# Patient Record
Sex: MALE | Race: BLACK OR AFRICAN AMERICAN | NOT HISPANIC OR LATINO | URBAN - METROPOLITAN AREA
[De-identification: names, ages, dates, MRNs, and addresses within clinical notes are randomized per-mention and may not be internally consistent; named-entity substitution may affect disease eponyms.]

---

## 2020-01-28 ENCOUNTER — INPATIENT (INPATIENT)
Facility: HOSPITAL | Age: 35
LOS: 1 days | Discharge: HOME | End: 2020-01-30
Attending: HOSPITALIST | Admitting: HOSPITALIST
Payer: MEDICAID

## 2020-01-28 VITALS
SYSTOLIC BLOOD PRESSURE: 112 MMHG | DIASTOLIC BLOOD PRESSURE: 70 MMHG | OXYGEN SATURATION: 100 % | TEMPERATURE: 100 F | HEART RATE: 92 BPM | RESPIRATION RATE: 18 BRPM

## 2020-01-28 LAB
ALBUMIN SERPL ELPH-MCNC: 3.4 G/DL — SIGNIFICANT CHANGE UP (ref 3.3–5)
ALP SERPL-CCNC: 120 U/L — SIGNIFICANT CHANGE UP (ref 40–120)
ALT FLD-CCNC: 225 U/L — HIGH (ref 4–41)
ANION GAP SERPL CALC-SCNC: 13 MMO/L — SIGNIFICANT CHANGE UP (ref 7–14)
ANISOCYTOSIS BLD QL: SLIGHT — SIGNIFICANT CHANGE UP
AST SERPL-CCNC: 226 U/L — HIGH (ref 4–40)
B PERT DNA SPEC QL NAA+PROBE: NOT DETECTED — SIGNIFICANT CHANGE UP
BASE EXCESS BLDV CALC-SCNC: 4 MMOL/L — SIGNIFICANT CHANGE UP
BASOPHILS # BLD AUTO: 0.06 K/UL — SIGNIFICANT CHANGE UP (ref 0–0.2)
BASOPHILS NFR BLD AUTO: 0.8 % — SIGNIFICANT CHANGE UP (ref 0–2)
BASOPHILS NFR SPEC: 0.9 % — SIGNIFICANT CHANGE UP (ref 0–2)
BILIRUB SERPL-MCNC: 1.8 MG/DL — HIGH (ref 0.2–1.2)
BLASTS # FLD: 0 % — SIGNIFICANT CHANGE UP (ref 0–0)
BLOOD GAS VENOUS - CREATININE: 1.31 MG/DL — HIGH (ref 0.5–1.3)
BUN SERPL-MCNC: 15 MG/DL — SIGNIFICANT CHANGE UP (ref 7–23)
C PNEUM DNA SPEC QL NAA+PROBE: NOT DETECTED — SIGNIFICANT CHANGE UP
CALCIUM SERPL-MCNC: 8.8 MG/DL — SIGNIFICANT CHANGE UP (ref 8.4–10.5)
CHLORIDE BLDV-SCNC: 94 MMOL/L — LOW (ref 96–108)
CHLORIDE SERPL-SCNC: 89 MMOL/L — LOW (ref 98–107)
CO2 SERPL-SCNC: 25 MMOL/L — SIGNIFICANT CHANGE UP (ref 22–31)
CREAT SERPL-MCNC: 1.24 MG/DL — SIGNIFICANT CHANGE UP (ref 0.5–1.3)
EOSINOPHIL # BLD AUTO: 0.01 K/UL — SIGNIFICANT CHANGE UP (ref 0–0.5)
EOSINOPHIL NFR BLD AUTO: 0.1 % — SIGNIFICANT CHANGE UP (ref 0–6)
EOSINOPHIL NFR FLD: 0 % — SIGNIFICANT CHANGE UP (ref 0–6)
FLUAV H1 2009 PAND RNA SPEC QL NAA+PROBE: NOT DETECTED — SIGNIFICANT CHANGE UP
FLUAV H1 RNA SPEC QL NAA+PROBE: NOT DETECTED — SIGNIFICANT CHANGE UP
FLUAV H3 RNA SPEC QL NAA+PROBE: NOT DETECTED — SIGNIFICANT CHANGE UP
FLUAV SUBTYP SPEC NAA+PROBE: NOT DETECTED — SIGNIFICANT CHANGE UP
FLUBV RNA SPEC QL NAA+PROBE: NOT DETECTED — SIGNIFICANT CHANGE UP
GAS PNL BLDV: 126 MMOL/L — LOW (ref 136–146)
GIANT PLATELETS BLD QL SMEAR: PRESENT — SIGNIFICANT CHANGE UP
GLUCOSE BLDV-MCNC: 114 MG/DL — HIGH (ref 70–99)
GLUCOSE SERPL-MCNC: 111 MG/DL — HIGH (ref 70–99)
HADV DNA SPEC QL NAA+PROBE: NOT DETECTED — SIGNIFICANT CHANGE UP
HCO3 BLDV-SCNC: 27 MMOL/L — SIGNIFICANT CHANGE UP (ref 20–27)
HCOV PNL SPEC NAA+PROBE: SIGNIFICANT CHANGE UP
HCT VFR BLD CALC: 33.3 % — LOW (ref 39–50)
HCT VFR BLDV CALC: 40 % — SIGNIFICANT CHANGE UP (ref 39–51)
HGB BLD-MCNC: 12.2 G/DL — LOW (ref 13–17)
HGB BLDV-MCNC: 13 G/DL — SIGNIFICANT CHANGE UP (ref 13–17)
HMPV RNA SPEC QL NAA+PROBE: NOT DETECTED — SIGNIFICANT CHANGE UP
HPIV1 RNA SPEC QL NAA+PROBE: NOT DETECTED — SIGNIFICANT CHANGE UP
HPIV2 RNA SPEC QL NAA+PROBE: NOT DETECTED — SIGNIFICANT CHANGE UP
HPIV3 RNA SPEC QL NAA+PROBE: NOT DETECTED — SIGNIFICANT CHANGE UP
HPIV4 RNA SPEC QL NAA+PROBE: NOT DETECTED — SIGNIFICANT CHANGE UP
IMM GRANULOCYTES NFR BLD AUTO: 4.4 % — HIGH (ref 0–1.5)
LACTATE BLDV-MCNC: 2.4 MMOL/L — HIGH (ref 0.5–2)
LYMPHOCYTES # BLD AUTO: 2.65 K/UL — SIGNIFICANT CHANGE UP (ref 1–3.3)
LYMPHOCYTES # BLD AUTO: 34.1 % — SIGNIFICANT CHANGE UP (ref 13–44)
LYMPHOCYTES NFR SPEC AUTO: 14.8 % — SIGNIFICANT CHANGE UP (ref 13–44)
MCHC RBC-ENTMCNC: 28.1 PG — SIGNIFICANT CHANGE UP (ref 27–34)
MCHC RBC-ENTMCNC: 36.6 % — HIGH (ref 32–36)
MCV RBC AUTO: 76.7 FL — LOW (ref 80–100)
METAMYELOCYTES # FLD: 0 % — SIGNIFICANT CHANGE UP (ref 0–1)
MONOCYTES # BLD AUTO: 1.01 K/UL — HIGH (ref 0–0.9)
MONOCYTES NFR BLD AUTO: 13 % — SIGNIFICANT CHANGE UP (ref 2–14)
MONOCYTES NFR BLD: 6.9 % — SIGNIFICANT CHANGE UP (ref 2–9)
MYELOCYTES NFR BLD: 0 % — SIGNIFICANT CHANGE UP (ref 0–0)
NEUTROPHIL AB SER-ACNC: 58.3 % — SIGNIFICANT CHANGE UP (ref 43–77)
NEUTROPHILS # BLD AUTO: 3.71 K/UL — SIGNIFICANT CHANGE UP (ref 1.8–7.4)
NEUTROPHILS NFR BLD AUTO: 47.6 % — SIGNIFICANT CHANGE UP (ref 43–77)
NEUTS BAND # BLD: 5.2 % — SIGNIFICANT CHANGE UP (ref 0–6)
NRBC # FLD: 0 K/UL — SIGNIFICANT CHANGE UP (ref 0–0)
OTHER - HEMATOLOGY %: 0 — SIGNIFICANT CHANGE UP
PCO2 BLDV: 45 MMHG — SIGNIFICANT CHANGE UP (ref 41–51)
PH BLDV: 7.42 PH — SIGNIFICANT CHANGE UP (ref 7.32–7.43)
PLATELET # BLD AUTO: 90 K/UL — LOW (ref 150–400)
PLATELET COUNT - ESTIMATE: SIGNIFICANT CHANGE UP
PMV BLD: 12.2 FL — SIGNIFICANT CHANGE UP (ref 7–13)
PO2 BLDV: 38 MMHG — SIGNIFICANT CHANGE UP (ref 35–40)
POLYCHROMASIA BLD QL SMEAR: SLIGHT — SIGNIFICANT CHANGE UP
POTASSIUM BLDV-SCNC: 4 MMOL/L — SIGNIFICANT CHANGE UP (ref 3.4–4.5)
POTASSIUM SERPL-MCNC: 3.6 MMOL/L — SIGNIFICANT CHANGE UP (ref 3.5–5.3)
POTASSIUM SERPL-SCNC: 3.6 MMOL/L — SIGNIFICANT CHANGE UP (ref 3.5–5.3)
PROMYELOCYTES # FLD: 0 % — SIGNIFICANT CHANGE UP (ref 0–0)
PROT SERPL-MCNC: 7.2 G/DL — SIGNIFICANT CHANGE UP (ref 6–8.3)
RBC # BLD: 4.34 M/UL — SIGNIFICANT CHANGE UP (ref 4.2–5.8)
RBC # FLD: 13.7 % — SIGNIFICANT CHANGE UP (ref 10.3–14.5)
RSV RNA SPEC QL NAA+PROBE: NOT DETECTED — SIGNIFICANT CHANGE UP
RV+EV RNA SPEC QL NAA+PROBE: NOT DETECTED — SIGNIFICANT CHANGE UP
SAO2 % BLDV: 68.2 % — SIGNIFICANT CHANGE UP (ref 60–85)
SMUDGE CELLS # BLD: PRESENT — SIGNIFICANT CHANGE UP
SODIUM SERPL-SCNC: 127 MMOL/L — LOW (ref 135–145)
TARGETS BLD QL SMEAR: SLIGHT — SIGNIFICANT CHANGE UP
VARIANT LYMPHS # BLD: 13.9 % — SIGNIFICANT CHANGE UP
WBC # BLD: 7.78 K/UL — SIGNIFICANT CHANGE UP (ref 3.8–10.5)
WBC # FLD AUTO: 7.78 K/UL — SIGNIFICANT CHANGE UP (ref 3.8–10.5)

## 2020-01-28 PROCEDURE — 71046 X-RAY EXAM CHEST 2 VIEWS: CPT | Mod: 26

## 2020-01-28 RX ORDER — SODIUM CHLORIDE 9 MG/ML
1000 INJECTION INTRAMUSCULAR; INTRAVENOUS; SUBCUTANEOUS ONCE
Refills: 0 | Status: COMPLETED | OUTPATIENT
Start: 2020-01-28 | End: 2020-01-28

## 2020-01-28 RX ORDER — ACETAMINOPHEN 500 MG
975 TABLET ORAL ONCE
Refills: 0 | Status: COMPLETED | OUTPATIENT
Start: 2020-01-28 | End: 2020-01-28

## 2020-01-28 RX ADMIN — SODIUM CHLORIDE 1000 MILLILITER(S): 9 INJECTION INTRAMUSCULAR; INTRAVENOUS; SUBCUTANEOUS at 21:31

## 2020-01-28 RX ADMIN — Medication 975 MILLIGRAM(S): at 21:31

## 2020-01-28 NOTE — ED PROVIDER NOTE - PROGRESS NOTE DETAILS
pt informed of results. pending us to be done. will need admission for elevated LFT hx malaria. Endorsed to Dr Moreno.

## 2020-01-28 NOTE — ED ADULT NURSE NOTE - CHIEF COMPLAINT QUOTE
Pt was recently in [place] x 3 weeks ago then went to [facility] arrives home today. Pt was sick while in [facility] reports developed fever on the 20th went to hospital there and tested positive for malaria,which he was then prescribed 1/27 medication for it. Pt today not feeling well temperature fluctuating feeling weak.
WDL

## 2020-01-28 NOTE — ED PROVIDER NOTE - CLINICAL SUMMARY MEDICAL DECISION MAKING FREE TEXT BOX
35 y/o M denies pmh c/o fever, body aches, lethargy x 8 days. Pt reports he was in [place] and [place] earlier this month, [place] the [place] on the 15th, then went to [facility] the next day. Pt began feeling symptoms on the 20th, went to seek medical care on the 24th, diagnosed positive for malaria on the 24th. Has been taking chloroquine and primiquine since. Also has been taking tylenol which relives the fever. Denies uri s/s-congestion, cough; denies sick contacts, cp, sob, rash, abd pain, n/v/d.    a/p: r/o other infectious causes; labs, rvp, ua , cxr; fluids, tylenol

## 2020-01-28 NOTE — ED PROVIDER NOTE - OBJECTIVE STATEMENT
35 y/o M denies pmh c/o fever, body aches, lethargy x 8 days. Pt reports he was in [place] and [place] earlier this month, [place] the [place] on the [place]th, then went to [facility] the next day. Pt began feeling symptoms on the 20th, went to seek medical care on the 24th, diagnosed positive for malaria on the 24th. Has been taking chloroquine and primiquine since. Also has been taking tylenol which relives the fever. Denies uri s/s-congestion, cough; denies sick contacts, cp, sob, rash, abd pain, n/v/d. 35 y/o M denies pmh c/o fever, body aches, lethargy x 8 days. Pt reports he was in [place], [place], [place] earlier this month, ran out of his malaria prophylaxis,  [place]rived to the [place] on the 15th, then went to [facility] the next day. Pt began feeling symptoms on the 20th, went to seek medical care on the 24th, diagnosed positive for malaria on the 24th. Has been taking chloroquine and primiquine since. Also has been taking tylenol which relives the fever. Denies uri s/s-congestion, cough; denies sick contacts, cp, sob, rash, abd pain, n/v/d.

## 2020-01-28 NOTE — ED ADULT TRIAGE NOTE - CHIEF COMPLAINT QUOTE
Pt was recently in [place] x 3 weeks ago then went to [facility] arrives home today. Pt was sick while in [facility] reports developed fever on the 20th went to hospital there and tested positive for malaria,which he was then prescribed 1/27 medication for it. Pt today not feeling well temperature fluctuating feeling weak.

## 2020-01-28 NOTE — ED ADULT NURSE NOTE - OBJECTIVE STATEMENT
pt received alert and oriented x4. pt c.o having fevers since traveling from [place] 3 weeks ago. pt states that he had a test done is [facility] where they told him he was malaria positive and given medication. pt c.o of not feeling better. respirations equal and unlabored. 20g placed in right a/c. labs drawn and sent. Call bell in reach, warm blanket provided, bed in lowest position, side rails up x2,safety maintained. will continue to monitor.

## 2020-01-28 NOTE — ED PROVIDER NOTE - ATTENDING CONTRIBUTION TO CARE
Attending Statement: I have personally seen and examined this patient. I have fully participated in the care of this patient. I have reviewed all pertinent clinical information, including history physical exam, plan and the Resident's note and agree except as noted  35yo M no sig pmhx pw 8 days of temp, chills, diffuse body ache and overall weak. PT states he was in [place] and [place] earlier in the month until Low [place]. On Jan 16 went to [facility], on Jan 20 endorse start fo symptoms. On Jan 24th was dx malaria started tx w chloroquine and primiquine (day 4) Denies congestion/cough no cp or abdominal pain no vomit/diarrhea. no runny nose or congestion. no rash. no urinary complaints. no rash. no headache no neck pain.   Vital signs noted. nontoxic appearing. EOMI. mmm. no exudate. non icteric. no juandice. normal S1-S2 No resp distress. able to speak in full and clear sentences. no wheeze, rales or stridor. soft nontender abdomen. no  rebound. no guarding. no sign of trauma. no CVAT AOx3, no focal deficits. CN 2-12 grossly intact. nl gait. no meningeal signs. no rash no pedal edema. no calf tenderness. normal pulses bilateral feet.  plan low temp 100.3 order labs/urine/rvp/ cxr/ IVF,  tylenol and re assess

## 2020-01-28 NOTE — ED ADULT NURSE NOTE - NSIMPLEMENTINTERV_GEN_ALL_ED
Implemented All Universal Safety Interventions:  [place] to call system. Call bell, personal items and telephone within reach. Instruct patient to call for assistance. Room bathroom lighting operational. Non-slip footwear when patient is off stretcher. Physically safe environment: no spills, clutter or unnecessary equipment. Stretcher in lowest position, wheels locked, appropriate side rails in place.

## 2020-01-29 DIAGNOSIS — N17.9 ACUTE KIDNEY FAILURE, UNSPECIFIED: ICD-10-CM

## 2020-01-29 DIAGNOSIS — E87.1 HYPO-OSMOLALITY AND HYPONATREMIA: ICD-10-CM

## 2020-01-29 DIAGNOSIS — Z02.9 ENCOUNTER FOR ADMINISTRATIVE EXAMINATIONS, UNSPECIFIED: ICD-10-CM

## 2020-01-29 DIAGNOSIS — M54.5 LOW BACK PAIN: ICD-10-CM

## 2020-01-29 DIAGNOSIS — Z29.9 ENCOUNTER FOR PROPHYLACTIC MEASURES, UNSPECIFIED: ICD-10-CM

## 2020-01-29 DIAGNOSIS — B54 UNSPECIFIED MALARIA: ICD-10-CM

## 2020-01-29 DIAGNOSIS — A41.9 SEPSIS, UNSPECIFIED ORGANISM: ICD-10-CM

## 2020-01-29 LAB
CREAT ?TM UR-MCNC: 156.8 MG/DL — SIGNIFICANT CHANGE UP
OSMOLALITY UR: 520 MOSMO/KG — SIGNIFICANT CHANGE UP (ref 50–1200)
PLASMODIUM AG BLD QL: SIGNIFICANT CHANGE UP
SODIUM UR-SCNC: 35 MMOL/L — SIGNIFICANT CHANGE UP
SPECIMEN SOURCE: SIGNIFICANT CHANGE UP
SPECIMEN SOURCE: SIGNIFICANT CHANGE UP

## 2020-01-29 PROCEDURE — 99222 1ST HOSP IP/OBS MODERATE 55: CPT | Mod: GC

## 2020-01-29 PROCEDURE — 99223 1ST HOSP IP/OBS HIGH 75: CPT | Mod: GC

## 2020-01-29 PROCEDURE — 76705 ECHO EXAM OF ABDOMEN: CPT | Mod: 26

## 2020-01-29 RX ORDER — ATOVAQUONE/PROGUANIL HCL 250-100 MG
4 TABLET ORAL EVERY 24 HOURS
Refills: 0 | Status: DISCONTINUED | OUTPATIENT
Start: 2020-01-29 | End: 2020-01-30

## 2020-01-29 RX ORDER — INFLUENZA VIRUS VACCINE 15; 15; 15; 15 UG/.5ML; UG/.5ML; UG/.5ML; UG/.5ML
0.5 SUSPENSION INTRAMUSCULAR ONCE
Refills: 0 | Status: DISCONTINUED | OUTPATIENT
Start: 2020-01-29 | End: 2020-01-30

## 2020-01-29 RX ORDER — SODIUM CHLORIDE 9 MG/ML
1000 INJECTION INTRAMUSCULAR; INTRAVENOUS; SUBCUTANEOUS
Refills: 0 | Status: DISCONTINUED | OUTPATIENT
Start: 2020-01-29 | End: 2020-01-30

## 2020-01-29 RX ORDER — ACETAMINOPHEN 500 MG
650 TABLET ORAL ONCE
Refills: 0 | Status: COMPLETED | OUTPATIENT
Start: 2020-01-29 | End: 2020-01-29

## 2020-01-29 RX ADMIN — Medication 650 MILLIGRAM(S): at 18:28

## 2020-01-29 RX ADMIN — SODIUM CHLORIDE 100 MILLILITER(S): 9 INJECTION INTRAMUSCULAR; INTRAVENOUS; SUBCUTANEOUS at 11:58

## 2020-01-29 RX ADMIN — Medication 4 TABLET(S): at 15:30

## 2020-01-29 RX ADMIN — Medication 650 MILLIGRAM(S): at 17:58

## 2020-01-29 NOTE — H&P ADULT - NSHPLABSRESULTS_GEN_ALL_CORE
LABS:                         12.2   7.78  )-----------( 90       ( 28 Jan 2020 21:29 )             33.3     01-28    127<L>  |  89<L>  |  15  ----------------------------<  111<H>  3.6   |  25  |  1.24    Ca    8.8      28 Jan 2020 21:29    TPro  7.2  /  Alb  3.4  /  TBili  1.8<H>  /  DBili  x   /  AST  226<H>  /  ALT  225<H>  /  AlkPhos  120  01-28      RADIOLOGY, EKG & ADDITIONAL TESTS: Reviewed.    RVP: negative     CXR  Prelim Read INTERPRETATION:  Linear opacities in the left lower lobe, favored to represent atelectasis.    US Abd   IMPRESSION: Sludge within the gallbladder without evidence for acute cholecystitis.

## 2020-01-29 NOTE — H&P ADULT - NSHPREVIEWOFSYSTEMS_GEN_ALL_CORE
General: +fever denies chills  HENT: denies nasal congestion, sore throat, rhinorrhea, ear pain  Eyes: denies visual changes, blurred vision, eye discharge, eye redness  Neck: denies neck pain  CV: denies chest pain, palpitations  Resp: denies difficulty breathing, cough  Abdominal: denies nausea, vomiting, diarrhea, abdominal pain, blood in stool, dark stool  MSK: +muscle aches, back pain  Neuro: denies headaches, numbness, tingling, dizziness, lightheadedness  Skin: denies rashes, cuts, bruises  Hematologic: denies unexplained bruises

## 2020-01-29 NOTE — H&P ADULT - PROBLEM SELECTOR PLAN 2
Hyponatremic to 127; asymptomatic  -f/u urine lytes, uric acid  -fluid restriction  -will monitor lytes Hyponatremic to 127; asymptomatic  -f/u urine lytes, uric acid  -will monitor lytes

## 2020-01-29 NOTE — H&P ADULT - ATTENDING COMMENTS
Patient seen and examined by me. Case discussed with resident and agree with the resident's findings and plan as documented in the resident's note. 34M with no PMHx with recent travel to Malaria-risk areas ([place]) and [place] p/w worsening fevers, body aches and lethargy x 8 days a/w malaria (dx'ed on 1/24/20 in [facility]).    1. Fevers likely 2/2 Malaria:  -f/u blood culture  -f/u blood smear  -f/u Malaria screen and PCR  -ID consult reviewed - cased d/w Dr. Lujan  -agree to start Malarone 4 tabs po daily x 3 days   -would check HIV, EBV serology and CMV serology for completeness Patient seen and examined by me. Case discussed with resident and agree with the resident's findings and plan as documented in the resident's note. 34M with no PMHx with recent travel to Malaria-risk areas ([place]) and [place] p/w worsening fevers, body aches and lethargy x 8 days a/w sepsis likely secondary to malaria (dx'ed on 1/24/20 in [facility]).    1. SEPSIS 2/2 Malaria:  -f/u blood culture  -f/u blood smear  -f/u Malaria screen and PCR  -ID consult reviewed - cased d/w Dr. Lujan  -agree to start Malarone 4 tabs po daily x 3 days   -would check HIV, EBV serology and CMV serology for completeness

## 2020-01-29 NOTE — H&P ADULT - PROBLEM SELECTOR PLAN 3
Scr 1.26, no baseline obtained likely pre-renal in setting of acute illness  -judicious fluid administration given hyponatremia  -will cont to monitor Scr 1.26, no baseline obtained likely pre-renal in setting of acute illness  -will cont to monitor

## 2020-01-29 NOTE — CONSULT NOTE ADULT - ASSESSMENT
***PENDING FINAL RECS*** 35 y/o M no significant PMHx presents with fever/chills x 8 days following travel to [place], found to have thrombocytopenia, transaminitis, and elevated bili on labs, concern for malaria.    #r/o Malaria  Fever/chills in setting of recent trip to [place], with labs significant for thrombocytopenia, elevated bili, and transaminitis. Most likely malaria, would need to treat as chloroquine-resistant malaria given travel to [place]. DDx also includes dengue, typhoid fever, chikungunya, and viral hepatitis. Lower suspicion for typhoid given no GI symptoms. Would expect LFTs to be higher if acute hepatitis.     Recs:  - atovaquone/proguanil 4 tabs PO once daily x 3 days  - f/u blood smear, malaria PCR  - f/u chikungunya Ab, dengue Ab  - consider sending HIV and acute hepatitis panel 33 y/o M no significant PMHx presents with fever/chills x 8 days following travel to [place], found to have thrombocytopenia, transaminitis, and elevated bili on labs, concern for malaria.    #r/o Malaria  Fever/chills in setting of recent trip to [place], with labs significant for thrombocytopenia, elevated bili, and transaminitis. Most likely malaria, would need to treat as chloroquine-resistant malaria given travel to [place]. DDx also includes dengue, typhoid fever, chikungunya, and viral hepatitis. Lower suspicion for typhoid given no GI symptoms. Would expect LFTs to be higher if acute hepatitis.     Recs:  - atovaquone/proguanil 4 tabs PO once daily x 3 days  - f/u blood smear, malaria PCR  - f/u chikungunya Ab, dengue Ab  - consider sending HIV, EBV/CMV serologies, and acute hepatitis panel

## 2020-01-29 NOTE — CONSULT NOTE ADULT - SUBJECTIVE AND OBJECTIVE BOX
Patient is a 34y old  Male who presents with a chief complaint of Malaria (29 Jan 2020 09:29)      HPI:  35 y/o M no significant PMHx presented with fevers/chills and generalized weakness x 8 days. Patient reports going on a trip to [facility] and [facility] from 12/27/19 to 1/15/20. [place] then traveled to [facility] on 1/17/20. Reports he began having fevers/chills/sweats on 1/20. Went to a hospital in [facility] on 1/24 and was diagnosed with malaria, was started on a course of chloroquine and primaquine, which he has taken for 3 days. [place] on 1/28/20 and presented to [facility] ED. Patient reports his fevers occur at night, have been occurring every night. Accompanied by sweats and generalized fatigue. Endorses lower back pain, no other joint pain or swelling. No myalgias, CP, SOB, cough, URI type symptoms, abdominal pain, N/V, diarrhea, dysuria, rash, change in vision, or headache. Reports he was mainly located in urban areas during his trips, denies being in wilderness. Does not specifically recall any insect bites. Did not go swimming. Drank only bottled water, no raw meat consumed. No exposure to livestock, no pets. Other travelers with him are fine, no sick contacts.     prior hospital charts reviewed [ x ]  primary team notes reviewed [ x ]  other consultant notes reviewed [ x ]    PAST MEDICAL & SURGICAL HISTORY:  No pertinent past medical history      Allergies  No Known Allergies        ANTIMICROBIALS (past 90 days)  MEDICATIONS  (STANDING):        ANTIMICROBIALS:        OTHER MEDS: MEDICATIONS  (STANDING):  influenza   Vaccine 0.5 once      SOCIAL HISTORY:   hx smoking  non-smoker    FAMILY HISTORY:      REVIEW OF SYSTEMS  [  ] ROS unobtainable because:    [ x ] All other systems negative except as noted below:	    Constitutional:   [ ] weight loss    Skin:  [ ] rash [ ] phlebitis	  Eyes: [ ] icterus [ ] pain  [ ] discharge	  ENMT: [ ] sore throat  [ ] thrush [ ] ulcers [ ] exudates  Respiratory: [ ] dyspnea [ ] hemoptysis [ ] cough [ ] sputum	  Cardiovascular:  [ ] chest pain [ ] palpitations [ ] edema	  Gastrointestinal:  [ ] nausea [ ] vomiting [ ] diarrhea [ ] constipation [ ] pain	  Genitourinary:  [ ] dysuria [ ] frequency [ ] hematuria [ ] discharge [ ] flank pain  [ ] incontinence  Musculoskeletal:  [ ] myalgias [ ] arthralgias [ ] arthritis  Neurological:  [ ] headache [ ] seizures  [ ] confusion/altered mental status  Psychiatric:  [ ] anxiety [ ] depression	  Hematology/Lymphatics:  [ ] lymphadenopathy  Endocrine:  [ ] adrenal [ ] thyroid  Allergic/Immunologic:	 [ ] transplant [ ] seasonal    Vital Signs Last 24 Hrs  T(F): 100 (01-29-20 @ 06:07), Max: 100.3 (01-28-20 @ 18:16)    Vital Signs Last 24 Hrs  HR: 94 (01-29-20 @ 06:07) (83 - 94)  BP: 103/60 (01-29-20 @ 06:07) (103/60 - 113/56)  RR: 17 (01-29-20 @ 06:07)  SpO2: 99% (01-29-20 @ 06:07) (99% - 100%)  Wt(kg): --    PHYSICAL EXAM:  Constitutional: non-toxic, no distress, awake  HEAD/EYES: atraumatic, anicteric, no conjunctival injection  ENT:  supple, no sores, no thrush  Cardiovascular:   normal S1, S2, no murmur, no edema  Respiratory:  equal breath sounds, no wheezes, no rales  GI:  soft, non-tender, normal bowel sounds  :  no zavala, no CVA tenderness  Musculoskeletal:  no synovitis, normal ROM  Neurologic: awake and alert,  normal strength, no focal findings  Skin:  no rash, no erythema, no phlebitis  Heme/Onc: no lymphadenopathy   Psychiatric:  awake, alert, appropriate mood                              12.2   7.78  )-----------( 90       ( 28 Jan 2020 21:29 )             33.3     01-28    127<L>  |  89<L>  |  15  ----------------------------<  111<H>  3.6   |  25  |  1.24    Ca    8.8      28 Jan 2020 21:29    TPro  7.2  /  Alb  3.4  /  TBili  1.8<H>  /  DBili  x   /  AST  226<H>  /  ALT  225<H>  /  AlkPhos  120  01-28          MICROBIOLOGY:                            RADIOLOGY:  imaging below personally reviewed Patient is a 34y old  Male who presents with a chief complaint of Malaria (29 Jan 2020 09:29)      HPI:  33 y/o M no significant PMHx presented with fevers/chills and generalized weakness x 8 days. Patient reports going on a trip to [facility] and [facility] from 12/27/19 to 1/15/20. [place] to [place] then traveled to [facility] on 1/17/20. Reports he began having fevers/chills/sweats on 1/20. Went to a hospital in [facility] on 1/24 and was diagnosed with malaria, was started on a course of chloroquine and primaquine, which he has taken for 3 days. [place] to [place] on 1/28/20 and presented to [facility] ED. Patient reports his fevers occur at night, have been occurring every night. Accompanied by sweats and generalized fatigue. Endorses lower back pain, no other joint pain or swelling. No myalgias, CP, SOB, cough, URI type symptoms, abdominal pain, N/V, diarrhea, dysuria, rash, change in vision, or headache. Reports he was mainly located in urban areas during his trips, denies being in wilderness. Does not specifically recall any insect bites. Did not go swimming. Drank only bottled water, no raw meat consumed. No exposure to livestock, no pets. Other travelers with him are fine, no sick contacts. Reports he took malaria prophylaxis but ran out on 1/3/20. Received vaccination for yellow fever prior to travel. No IVDU, denies high risk sexual activity.    prior hospital charts reviewed [ x ]  primary team notes reviewed [ x ]  other consultant notes reviewed [ x ]    PAST MEDICAL & SURGICAL HISTORY:  No pertinent past medical history      Allergies  No Known Allergies        ANTIMICROBIALS (past 90 days)  MEDICATIONS  (STANDING):        ANTIMICROBIALS:        OTHER MEDS: MEDICATIONS  (STANDING):  influenza   Vaccine 0.5 once      SOCIAL HISTORY:   hx smoking  non-smoker    FAMILY HISTORY:      REVIEW OF SYSTEMS  [  ] ROS unobtainable because:    [ x ] All other systems negative except as noted below:	    Constitutional:   [ ] weight loss    Skin:  [ ] rash [ ] phlebitis	  Eyes: [ ] icterus [ ] pain  [ ] discharge	  ENMT: [ ] sore throat  [ ] thrush [ ] ulcers [ ] exudates  Respiratory: [ ] dyspnea [ ] hemoptysis [ ] cough [ ] sputum	  Cardiovascular:  [ ] chest pain [ ] palpitations [ ] edema	  Gastrointestinal:  [ ] nausea [ ] vomiting [ ] diarrhea [ ] constipation [ ] pain	  Genitourinary:  [ ] dysuria [ ] frequency [ ] hematuria [ ] discharge [ ] flank pain  [ ] incontinence  Musculoskeletal:  [ ] myalgias [ ] arthralgias [ ] arthritis  Neurological:  [ ] headache [ ] seizures  [ ] confusion/altered mental status  Psychiatric:  [ ] anxiety [ ] depression	  Hematology/Lymphatics:  [ ] lymphadenopathy  Endocrine:  [ ] adrenal [ ] thyroid  Allergic/Immunologic:	 [ ] transplant [ ] seasonal    Vital Signs Last 24 Hrs  T(F): 100 (01-29-20 @ 06:07), Max: 100.3 (01-28-20 @ 18:16)    Vital Signs Last 24 Hrs  HR: 94 (01-29-20 @ 06:07) (83 - 94)  BP: 103/60 (01-29-20 @ 06:07) (103/60 - 113/56)  RR: 17 (01-29-20 @ 06:07)  SpO2: 99% (01-29-20 @ 06:07) (99% - 100%)  Wt(kg): --    PHYSICAL EXAM:  Constitutional: non-toxic, no distress, awake  HEAD/EYES: atraumatic, anicteric, no conjunctival injection  ENT:  supple, no sores, no thrush  Cardiovascular:   normal S1, S2, no murmur, no edema  Respiratory:  equal breath sounds, no wheezes, no rales  GI:  soft, non-tender, normal bowel sounds  :  no zavala, no CVA tenderness  Musculoskeletal:  no synovitis, normal ROM  Neurologic: awake and alert,  normal strength, no focal findings  Skin:  no rash, no erythema, no phlebitis  Heme/Onc: no lymphadenopathy   Psychiatric:  awake, alert, appropriate mood                              12.2   7.78  )-----------( 90       ( 28 Jan 2020 21:29 )             33.3     01-28    127<L>  |  89<L>  |  15  ----------------------------<  111<H>  3.6   |  25  |  1.24    Ca    8.8      28 Jan 2020 21:29    TPro  7.2  /  Alb  3.4  /  TBili  1.8<H>  /  DBili  x   /  AST  226<H>  /  ALT  225<H>  /  AlkPhos  120  01-28          MICROBIOLOGY:                            RADIOLOGY:  imaging below personally reviewed Patient is a 34y old  Male who presents with a chief complaint of Malaria (29 Jan 2020 09:29)      HPI:  33 y/o M no significant PMHx presented with fevers/chills and generalized weakness x 8 days. Patient reports going on a trip to [facility] from 12/27/19 to 1/15/20. [place] then traveled to [facility] on 1/17/20. Reports he began having fevers/chills/sweats on 1/20. Went to a hospital in [facility] on 1/24 and was diagnosed with malaria, was started on a course of chloroquine and primaquine, which he has taken for 3 days. [place] 1/28/20 and presented to [facility] ED. Patient reports his fevers occur at night, have been occurring every night. Accompanied by sweats and generalized fatigue. Endorses lower back pain, no other joint pain or swelling. No myalgias, CP, SOB, cough, URI type symptoms, abdominal pain, N/V, diarrhea, dysuria, rash, change in vision, or headache. Reports he was mainly located in urban areas during his trips, denies being in wilderness. Does not specifically recall any insect bites. Did not go swimming. Drank only bottled water, no raw meat consumed. No exposure to livestock, no pets. Other travelers with him are fine, no sick contacts. Reports he took malaria prophylaxis but ran out on 1/3/20. Received vaccination for yellow fever prior to travel. No IVDU, denies high risk sexual activity.    prior hospital charts reviewed [ x ]  primary team notes reviewed [ x ]  other consultant notes reviewed [ x ]    PAST MEDICAL & SURGICAL HISTORY:  No pertinent past medical history      Allergies  No Known Allergies    ANTIMICROBIALS (past 90 days)  MEDICATIONS  (STANDING):    ANTIMICROBIALS:      OTHER MEDS: MEDICATIONS  (STANDING):  influenza   Vaccine 0.5 once    SOCIAL HISTORY:   non-smoker    FAMILY HISTORY:  No significant family history    REVIEW OF SYSTEMS  [  ] ROS unobtainable because:    [ x ] All other systems negative except as noted below:	    Constitutional:   [ ] weight loss    Skin:  [ ] rash [ ] phlebitis	  Eyes: [ ] icterus [ ] pain  [ ] discharge	  ENMT: [ ] sore throat  [ ] thrush [ ] ulcers [ ] exudates  Respiratory: [ ] dyspnea [ ] hemoptysis [ ] cough [ ] sputum	  Cardiovascular:  [ ] chest pain [ ] palpitations [ ] edema	  Gastrointestinal:  [ ] nausea [ ] vomiting [ ] diarrhea [ ] constipation [ ] pain	  Genitourinary:  [ ] dysuria [ ] frequency [ ] hematuria [ ] discharge [ ] flank pain  [ ] incontinence  Musculoskeletal:  [ ] myalgias [ ] arthralgias [ ] arthritis  Neurological:  [ ] headache [ ] seizures  [ ] confusion/altered mental status  Psychiatric:  [ ] anxiety [ ] depression	  Hematology/Lymphatics:  [ ] lymphadenopathy  Endocrine:  [ ] adrenal [ ] thyroid  Allergic/Immunologic:	 [ ] transplant [ ] seasonal    Vital Signs Last 24 Hrs  T(F): 100 (01-29-20 @ 06:07), Max: 100.3 (01-28-20 @ 18:16)    Vital Signs Last 24 Hrs  HR: 94 (01-29-20 @ 06:07) (83 - 94)  BP: 103/60 (01-29-20 @ 06:07) (103/60 - 113/56)  RR: 17 (01-29-20 @ 06:07)  SpO2: 99% (01-29-20 @ 06:07) (99% - 100%)  Wt(kg): --    PHYSICAL EXAM:  Constitutional: non-toxic, no distress, awake  HEAD/EYES: atraumatic, anicteric, no conjunctival injection  ENT:  supple, no sores, no thrush  Cardiovascular:   normal S1, S2, no murmur, no edema  Respiratory:  equal breath sounds, no wheezes, no rales  GI:  soft, non-tender, normal bowel sounds  :  no zavala, no CVA tenderness  Musculoskeletal:  no synovitis, normal ROM  Neurologic: awake and alert,  normal strength, no focal findings  Skin:  no rash, no erythema, no phlebitis  Heme/Onc: no lymphadenopathy   Psychiatric:  awake, alert, appropriate mood                            12.2   7.78  )-----------( 90       ( 28 Jan 2020 21:29 )             33.3     01-28    127<L>  |  89<L>  |  15  ----------------------------<  111<H>  3.6   |  25  |  1.24    Ca    8.8      28 Jan 2020 21:29    TPro  7.2  /  Alb  3.4  /  TBili  1.8<H>  /  DBili  x   /  AST  226<H>  /  ALT  225<H>  /  AlkPhos  120  01-28          MICROBIOLOGY:      RADIOLOGY:  EXAM:  US ABDOMEN LIMITED    PROCEDURE DATE:  Jan 29 2020   FINDINGS:  Liver: Within normal limits.  Bile ducts: Normal caliber. Common bile duct measures 3.0 mm.   Gallbladder: Sludge within the gallbladder. No gallbladder wall thickening or pericholecystic fluid. Limited evaluation for sonographic Yusuf sign as the patient was given pain medication.  Pancreas: Visualized portions are within normal limits.  Right kidney: 11.7 cm. No hydronephrosis.  Ascites: None.  IVC: Visualized portions are within normal limits.  Right pleural effusion.    IMPRESSION:   Sludge within the gallbladder without evidence for acute cholecystitis. Patient is a 34y old  Male who presents with a chief complaint of fever/chills.      HPI:  33 y/o M no significant PMHx presented with fevers/chills and generalized weakness x 8 days. Patient reports going on a trip to [facility] and [facility] from 12/27/19 to 1/15/20. [place] then traveled to [facility] on 1/17/20. Reports he began having fevers/chills/sweats on 1/20. Went to a hospital in [facility] on 1/24 and was diagnosed with malaria, was started on a course of chloroquine and primaquine, which he has taken for 3 days. [place]ed to [place] on 1/28/20 and presented to [facility] ED. Patient reports his fevers occur at night, have been occurring every night. Accompanied by sweats and generalized fatigue. Endorses lower back pain, no other joint pain or swelling. No myalgias, CP, SOB, cough, URI type symptoms, abdominal pain, N/V, diarrhea, dysuria, rash, change in vision, or headache. Reports he was mainly located in urban areas during his trips, denies being in wilderness. Does not specifically recall any insect bites. Did not go swimming. Drank only bottled water, no raw meat consumed. No exposure to livestock, no pets. Other travelers with him are fine, no sick contacts. Reports he took malaria prophylaxis but ran out on 1/3/20. Received vaccination for yellow fever prior to travel. No IVDU, denies high risk sexual activity.    prior hospital charts reviewed [ x ]  primary team notes reviewed [ x ]  other consultant notes reviewed [ x ]    PAST MEDICAL & SURGICAL HISTORY:  No pertinent past medical history      Allergies  No Known Allergies    ANTIMICROBIALS (past 90 days)  MEDICATIONS  (STANDING):    ANTIMICROBIALS:      OTHER MEDS: MEDICATIONS  (STANDING):  influenza   Vaccine 0.5 once    SOCIAL HISTORY:   non-smoker    FAMILY HISTORY:  No significant family history    REVIEW OF SYSTEMS  [  ] ROS unobtainable because:    [ x ] All other systems negative except as noted below:	    Constitutional:   [ ] weight loss    Skin:  [ ] rash [ ] phlebitis	  Eyes: [ ] icterus [ ] pain  [ ] discharge	  ENMT: [ ] sore throat  [ ] thrush [ ] ulcers [ ] exudates  Respiratory: [ ] dyspnea [ ] hemoptysis [ ] cough [ ] sputum	  Cardiovascular:  [ ] chest pain [ ] palpitations [ ] edema	  Gastrointestinal:  [ ] nausea [ ] vomiting [ ] diarrhea [ ] constipation [ ] pain	  Genitourinary:  [ ] dysuria [ ] frequency [ ] hematuria [ ] discharge [ ] flank pain  [ ] incontinence  Musculoskeletal:  [ ] myalgias [ ] arthralgias [ ] arthritis  Neurological:  [ ] headache [ ] seizures  [ ] confusion/altered mental status  Psychiatric:  [ ] anxiety [ ] depression	  Hematology/Lymphatics:  [ ] lymphadenopathy  Endocrine:  [ ] adrenal [ ] thyroid  Allergic/Immunologic:	 [ ] transplant [ ] seasonal    Vital Signs Last 24 Hrs  T(F): 100 (01-29-20 @ 06:07), Max: 100.3 (01-28-20 @ 18:16)    Vital Signs Last 24 Hrs  HR: 94 (01-29-20 @ 06:07) (83 - 94)  BP: 103/60 (01-29-20 @ 06:07) (103/60 - 113/56)  RR: 17 (01-29-20 @ 06:07)  SpO2: 99% (01-29-20 @ 06:07) (99% - 100%)  Wt(kg): --    PHYSICAL EXAM:  Constitutional: non-toxic, no distress, awake  HEAD/EYES: atraumatic, anicteric, no conjunctival injection  ENT:  supple, no sores, no thrush  Cardiovascular:   normal S1, S2, no murmur, no edema  Respiratory:  equal breath sounds, no wheezes, no rales  GI:  soft, non-tender, normal bowel sounds  :  no zavala, no CVA tenderness  Musculoskeletal:  no synovitis, normal ROM  Neurologic: awake and alert,  normal strength, no focal findings  Skin:  no rash, no erythema, no phlebitis  Heme/Onc: no lymphadenopathy   Psychiatric:  awake, alert, appropriate mood                            12.2   7.78  )-----------( 90       ( 28 Jan 2020 21:29 )             33.3     01-28    127<L>  |  89<L>  |  15  ----------------------------<  111<H>  3.6   |  25  |  1.24    Ca    8.8      28 Jan 2020 21:29    TPro  7.2  /  Alb  3.4  /  TBili  1.8<H>  /  DBili  x   /  AST  226<H>  /  ALT  225<H>  /  AlkPhos  120  01-28          MICROBIOLOGY:      RADIOLOGY:  EXAM:  US ABDOMEN LIMITED    PROCEDURE DATE:  Jan 29 2020   FINDINGS:  Liver: Within normal limits.  Bile ducts: Normal caliber. Common bile duct measures 3.0 mm.   Gallbladder: Sludge within the gallbladder. No gallbladder wall thickening or pericholecystic fluid. Limited evaluation for sonographic Yusuf sign as the patient was given pain medication.  Pancreas: Visualized portions are within normal limits.  Right kidney: 11.7 cm. No hydronephrosis.  Ascites: None.  IVC: Visualized portions are within normal limits.  Right pleural effusion.    IMPRESSION:   Sludge within the gallbladder without evidence for acute cholecystitis.

## 2020-01-29 NOTE — H&P ADULT - NSHPPHYSICALEXAM_GEN_ALL_CORE
General appearance: NAD, conversant, afebrile    Eyes: mild scleral icterus, moist conjunctivae; no lid-lag; Pupils equal, round and reactive to light; Extraocular muscles intact   HENT: Atraumatic; oropharynx clear with moist mucous membranes and no mucosal ulcerations; normal hard and soft palate; no pharyngeal erythema or exudate   Neck: Trachea midline; Full range of motion, supple   Pulm: Lungs clear to auscultation bilaterally, with normal respiratory effort and no intercostal retractions; normal work of breathing   CV: Regular Rhythm and Rate; Normal S1, S2; No murmurs, rubs, or gallops. 2+ peripheral pulses.   Abdomen: Soft, non-tender, non-distended   Extremities: No peripheral edema, 5/5 strength in all four extremities.   Psych: Appropriate affect, cooperative; alert and oriented to person, place and time

## 2020-01-29 NOTE — H&P ADULT - PROBLEM SELECTOR PLAN 6
1.  Name of PCP: none  2.  PCP Contacted on Admission: [ ] Y    [ ] N    3.  PCP contacted at Discharge: [ ] Y    [ ] N    [ ] N/A  4.  Post-Discharge Appointment Date and Location:  5.  Summary of Handoff given to PCP: 1.  Name of PCP: none but will give patient resources prior to discharge for new PCP  2.  PCP Contacted on Admission: [ ] Y    [ ] N    3.  PCP contacted at Discharge: [ ] Y    [ ] N    [ ] N/A  4.  Post-Discharge Appointment Date and Location:  5.  Summary of Handoff given to PCP:

## 2020-01-29 NOTE — H&P ADULT - ASSESSMENT
33 y/o M w/ no Mhx recently returned from [facility]/[place] tested positive for malaria on 1/24 s/p 6 day course of chloroquine/primaquine p/w worsening fevers, body aches and lethargy x 8 days likely 2/2 to malaria 33 y/o M w/ no Mhx recently returned from [facility]/[place] tested positive for malaria on 1/24 s/p 6 day course of chloroquine/primaquine p/w worsening fevers, body aches and lethargy x 8 days a/w sepsis likely 2/2 to malaria 35 y/o M w/ no Mhx recently returned from [facility]/[place] tested positive for malaria on 1/24 s/p 6 day course of chloroquine/primaquine p/w worsening fevers, body aches and lethargy x 9 days a/w 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

## 2020-01-29 NOTE — CONSULT NOTE ADULT - ATTENDING COMMENTS
34M with fever, travel to [place], transaminitis, reactive lymphocytes  -suspect malaria  -Malarone 4 tabs po daily x 3 days   -check malaria screen and PCR  -check blood cx  -doubt typhoid or Hep A  -consider HIV, EBV serology and CMV serology    Don Saleh  Attending Physician   Division of Infectious Disease  Pager #[phone]  After 5pm/weekend or no response, call #[phone]

## 2020-01-29 NOTE — H&P ADULT - NSHPSOCIALHISTORY_GEN_ALL_CORE
No smoking tobacco, drinks socially, denies illicit drug use.  Works as a teacher, was travelling recently for vacation

## 2020-01-29 NOTE — H&P ADULT - PROBLEM SELECTOR PLAN 1
[facility] tested positive for malaria s/p 6 days of tx w/ primaquine/chloroquine; Bili-1.8, AST-226, ALT-225, Lactate-2,4. Will r/o other infectious etiologies  -f/u chickungunya ab, malaria pcr, dengue fever ab, blood/urine cx.   -ID c/s for further management of malaria  -tylenol prn for fever -patient with fevers at home, tachycardia >90 and a source of infection (Malaria parasite)  -sepsis 2/2 to most likely Malaria  Recently traveled to [place] tested positive for malaria s/p 6 days of tx w/ primaquine/chloroquine; Bili-1.8, AST-226, ALT-225, Lactate-2,4. Will r/o other infectious etiologies  -f/u chickungunya ab, malaria pcr, dengue fever ab, blood/urine cx.   -ID c/s for further management of malaria  -tylenol prn for fever

## 2020-01-29 NOTE — H&P ADULT - HISTORY OF PRESENT ILLNESS
33 y/o M w/ no Mhx p/w fevers, body aches and lethargy x 8 days. Patient states 33 y/o M w/ no Mhx p/w worsening fevers, body aches and lethargy x 8 days. Patient has recently traveled to [facility] and [facility], [place] the [place] on [place]th leaving the same day to go to [facility]. Patient started experiencing these symptoms x 9 days (1/20) and went to seek care on 1/24, tested positive for malaria and was d/c w/ a 6 day course of chloroquine and primaquine. Patient states that fever has been intermittently controlled by tylenol but has returned to the ED w/ worsening symptoms despite treatement. Patient reports lower back pain. Patient does not report n/v/d, CP, SOB, neck pain, abd pain, ear/eye discharge. 33 y/o M w/ no Mhx p/w worsening fevers, body aches and lethargy x 9 days. Patient has recently traveled to [facility] and [facility], recently [place] to the [place] on the [place]th leaving the same day to go to [facility]. Patient started experiencing these symptoms x 9 days (1/20) and went to seek care on 1/24, tested positive for malaria and was d/c w/ a 6 day course of chloroquine and primaquine. Patient states that fever has been intermittently controlled by tylenol but has returned to the ED w/ worsening symptoms despite treatement. Patient reports lower back pain. Patient does not report n/v/d, CP, SOB, neck pain, abd pain, ear/eye discharge.

## 2020-01-30 VITALS
TEMPERATURE: 98 F | RESPIRATION RATE: 16 BRPM | OXYGEN SATURATION: 98 % | DIASTOLIC BLOOD PRESSURE: 72 MMHG | HEART RATE: 78 BPM | SYSTOLIC BLOOD PRESSURE: 116 MMHG

## 2020-01-30 LAB
ALBUMIN SERPL ELPH-MCNC: 2.8 G/DL — LOW (ref 3.3–5)
ALP SERPL-CCNC: 126 U/L — HIGH (ref 40–120)
ALT FLD-CCNC: 160 U/L — HIGH (ref 4–41)
ANION GAP SERPL CALC-SCNC: 13 MMO/L — SIGNIFICANT CHANGE UP (ref 7–14)
AST SERPL-CCNC: 100 U/L — HIGH (ref 4–40)
BASOPHILS # BLD AUTO: 0.04 K/UL — SIGNIFICANT CHANGE UP (ref 0–0.2)
BASOPHILS NFR BLD AUTO: 0.7 % — SIGNIFICANT CHANGE UP (ref 0–2)
BILIRUB SERPL-MCNC: 1.1 MG/DL — SIGNIFICANT CHANGE UP (ref 0.2–1.2)
BUN SERPL-MCNC: 14 MG/DL — SIGNIFICANT CHANGE UP (ref 7–23)
CALCIUM SERPL-MCNC: 8.4 MG/DL — SIGNIFICANT CHANGE UP (ref 8.4–10.5)
CHLORIDE SERPL-SCNC: 95 MMOL/L — LOW (ref 98–107)
CK SERPL-CCNC: 58 U/L — SIGNIFICANT CHANGE UP (ref 30–200)
CO2 SERPL-SCNC: 23 MMOL/L — SIGNIFICANT CHANGE UP (ref 22–31)
CREAT SERPL-MCNC: 1.12 MG/DL — SIGNIFICANT CHANGE UP (ref 0.5–1.3)
EOSINOPHIL # BLD AUTO: 0.01 K/UL — SIGNIFICANT CHANGE UP (ref 0–0.5)
EOSINOPHIL NFR BLD AUTO: 0.2 % — SIGNIFICANT CHANGE UP (ref 0–6)
GLUCOSE SERPL-MCNC: 107 MG/DL — HIGH (ref 70–99)
HCT VFR BLD CALC: 29.1 % — LOW (ref 39–50)
HGB BLD-MCNC: 10.7 G/DL — LOW (ref 13–17)
IMM GRANULOCYTES NFR BLD AUTO: 5.1 % — HIGH (ref 0–1.5)
LYMPHOCYTES # BLD AUTO: 2.56 K/UL — SIGNIFICANT CHANGE UP (ref 1–3.3)
LYMPHOCYTES # BLD AUTO: 42.4 % — SIGNIFICANT CHANGE UP (ref 13–44)
MAGNESIUM SERPL-MCNC: 2.3 MG/DL — SIGNIFICANT CHANGE UP (ref 1.6–2.6)
MANUAL SMEAR VERIFICATION: SIGNIFICANT CHANGE UP
MCHC RBC-ENTMCNC: 27.7 PG — SIGNIFICANT CHANGE UP (ref 27–34)
MCHC RBC-ENTMCNC: 36.8 % — HIGH (ref 32–36)
MCV RBC AUTO: 75.4 FL — LOW (ref 80–100)
MONOCYTES # BLD AUTO: 1.11 K/UL — HIGH (ref 0–0.9)
MONOCYTES NFR BLD AUTO: 18.4 % — HIGH (ref 2–14)
NEUTROPHILS # BLD AUTO: 2.01 K/UL — SIGNIFICANT CHANGE UP (ref 1.8–7.4)
NEUTROPHILS NFR BLD AUTO: 33.2 % — LOW (ref 43–77)
NRBC # FLD: 0 K/UL — SIGNIFICANT CHANGE UP (ref 0–0)
OSMOLALITY SERPL: 279 MOSMO/KG — SIGNIFICANT CHANGE UP (ref 275–295)
PHOSPHATE SERPL-MCNC: 4.2 MG/DL — SIGNIFICANT CHANGE UP (ref 2.5–4.5)
PLATELET # BLD AUTO: 130 K/UL — LOW (ref 150–400)
PMV BLD: 11.6 FL — SIGNIFICANT CHANGE UP (ref 7–13)
POTASSIUM SERPL-MCNC: 3.6 MMOL/L — SIGNIFICANT CHANGE UP (ref 3.5–5.3)
POTASSIUM SERPL-SCNC: 3.6 MMOL/L — SIGNIFICANT CHANGE UP (ref 3.5–5.3)
PROT SERPL-MCNC: 6.6 G/DL — SIGNIFICANT CHANGE UP (ref 6–8.3)
RBC # BLD: 3.86 M/UL — LOW (ref 4.2–5.8)
RBC # FLD: 14 % — SIGNIFICANT CHANGE UP (ref 10.3–14.5)
REVIEW TO FOLLOW: YES — SIGNIFICANT CHANGE UP
SODIUM SERPL-SCNC: 131 MMOL/L — LOW (ref 135–145)
URATE SERPL-MCNC: 4.3 MG/DL — SIGNIFICANT CHANGE UP (ref 3.4–8.8)
WBC # BLD: 6.04 K/UL — SIGNIFICANT CHANGE UP (ref 3.8–10.5)
WBC # FLD AUTO: 6.04 K/UL — SIGNIFICANT CHANGE UP (ref 3.8–10.5)

## 2020-01-30 PROCEDURE — 99232 SBSQ HOSP IP/OBS MODERATE 35: CPT

## 2020-01-30 PROCEDURE — 99239 HOSP IP/OBS DSCHRG MGMT >30: CPT

## 2020-01-30 RX ORDER — PRIMAQUINE PHOSPHATE 15 MG/1
1 TABLET ORAL
Qty: 0 | Refills: 0 | DISCHARGE

## 2020-01-30 RX ORDER — SODIUM CHLORIDE 9 MG/ML
1000 INJECTION INTRAMUSCULAR; INTRAVENOUS; SUBCUTANEOUS
Refills: 0 | Status: DISCONTINUED | OUTPATIENT
Start: 2020-01-30 | End: 2020-01-30

## 2020-01-30 RX ORDER — KETOCONAZOLE 200 MG/1
1 TABLET ORAL
Qty: 0 | Refills: 0 | DISCHARGE

## 2020-01-30 RX ORDER — ATOVAQUONE/PROGUANIL HCL 250-100 MG
4 TABLET ORAL
Qty: 4 | Refills: 0
Start: 2020-01-30 | End: 2020-01-30

## 2020-01-30 RX ADMIN — SODIUM CHLORIDE 200 MILLILITER(S): 9 INJECTION INTRAMUSCULAR; INTRAVENOUS; SUBCUTANEOUS at 13:45

## 2020-01-30 RX ADMIN — Medication 4 TABLET(S): at 15:14

## 2020-01-30 NOTE — DISCHARGE NOTE PROVIDER - CARE PROVIDER_API CALL
Don Saleh; MBBS)  Infectious Disease; Internal Medicine  [address] [place] [address]  Phone: [phone]  Fax: [phone]  Follow Up Time: 2 weeks

## 2020-01-30 NOTE — PROGRESS NOTE ADULT - ASSESSMENT
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
33 y/o M no significant PMHx presents with fever/chills x 8 days following travel to [place]/[facility], found to have thrombocytopenia, transaminitis, and elevated bili on labs, concern for malaria.    Malaria  Fever/chills in setting of recent trip to [place], with labs significant for thrombocytopenia, elevated bili, and transaminitis. Most likely malaria, would need to treat as chloroquine-resistant malaria given travel to [place]. DDx also includes dengue, typhoid fever, chikungunya, and viral hepatitis. Lower suspicion for typhoid given no GI symptoms. Would expect LFTs to be higher if acute hepatitis.     Recs:  - atovaquone/proguanil 4 tabs PO once daily x 3 days  - better  - f/u in ID office in 2 weeks [phone] to recheck labs

## 2020-01-30 NOTE — PROGRESS NOTE ADULT - ATTENDING COMMENTS
Patient seen and examined by me. Case discussed with resident and agree with the resident's findings and plan as documented in the resident's note. 34M with no PMHx with recent travel to Malaria-risk areas ([place]) and [place] p/w worsening fevers, body aches and lethargy x 8 days a/w sepsis likely secondary to malaria (dx'ed on 1/24/20 in [facility]).    1. SEPSIS 2/2 Malaria:  -blood culture negative  -blood smear reviewed   -Malaria screen positive   -ID consult reviewed - cased d/w Dr. Saleh who will follow-up with patient as outpatient in 2 weeks (office #: [phone])  -c/w Malarone 4 tabs po daily day #2/3     2. Dispo:  -d/c home today  -time spent discharging patient = 38min  -see d/c summary in sunrise
Don Saleh  Attending Physician   Division of Infectious Disease  Pager #[phone]  After 5pm/weekend or no response, call #[phone]

## 2020-01-30 NOTE — PROGRESS NOTE ADULT - SUBJECTIVE AND OBJECTIVE BOX
JOE LUJAN 34y [id number]    Patient is a 34y old  Male who presents with a chief complaint of Malaria (30 Jan 2020 12:39)      Follow Up/CC:  ID following for fever    Interval History/ROS: feels much better, fever better    Allergies    No Known Allergies    Intolerances        ANTIMICROBIALS:  atovaquone 250 mG/proguanil 100 mG 4 every 24 hours      MEDICATIONS  (STANDING):  atovaquone 250 mG/proguanil 100 mG 4 Tablet(s) Oral every 24 hours  influenza   Vaccine 0.5 milliLiter(s) IntraMuscular once  sodium chloride 0.9%. 1000 milliLiter(s) (100 mL/Hr) IV Continuous <Continuous>  sodium chloride 0.9%. 1000 milliLiter(s) (200 mL/Hr) IV Continuous <Continuous>    MEDICATIONS  (PRN):        Vital Signs Last 24 Hrs  T(C): 36.6 (30 Jan 2020 12:55), Max: 37.4 (30 Jan 2020 05:31)  T(F): 97.8 (30 Jan 2020 12:55), Max: 99.4 (30 Jan 2020 05:31)  HR: 78 (30 Jan 2020 12:55) (78 - 82)  BP: 116/72 (30 Jan 2020 12:55) (101/65 - 117/65)  BP(mean): --  RR: 16 (30 Jan 2020 12:55) (16 - 18)  SpO2: 98% (30 Jan 2020 12:55) (98% - 98%)    CBC Full  -  ( 30 Jan 2020 05:14 )  WBC Count : 6.04 K/uL  RBC Count : 3.86 M/uL  Hemoglobin : 10.7 g/dL  Hematocrit : 29.1 %  Platelet Count - Automated : 130 K/uL  Mean Cell Volume : 75.4 fL  Mean Cell Hemoglobin : 27.7 pg  Mean Cell Hemoglobin Concentration : 36.8 %  Auto Neutrophil # : 2.01 K/uL  Auto Lymphocyte # : 2.56 K/uL  Auto Monocyte # : 1.11 K/uL  Auto Eosinophil # : 0.01 K/uL  Auto Basophil # : 0.04 K/uL  Auto Neutrophil % : 33.2 %  Auto Lymphocyte % : 42.4 %  Auto Monocyte % : 18.4 %  Auto Eosinophil % : 0.2 %  Auto Basophil % : 0.7 %    01-30    131<L>  |  95<L>  |  14  ----------------------------<  107<H>  3.6   |  23  |  1.12    Ca    8.4      30 Jan 2020 05:14  Phos  4.2     01-30  Mg     2.3     01-30    TPro  6.6  /  Alb  2.8<L>  /  TBili  1.1  /  DBili  x   /  AST  100<H>  /  ALT  160<H>  /  AlkPhos  126<H>  01-30    LIVER FUNCTIONS - ( 30 Jan 2020 05:14 )  Alb: 2.8 g/dL / Pro: 6.6 g/dL / ALK PHOS: 126 u/L / ALT: 160 u/L / AST: 100 u/L / GGT: x               MICROBIOLOGY:  BLOOD VENOUS  01-28-20 --  --  --      BLOOD PERIPHERAL  01-28-20 --  --  --      Rapid Respiratory Viral Panel (01.28.20 @ 21:29)    Adenovirus (RapRVP): Not Detected    Influenza A (RapRVP): Not Detected    Influenza AH1 2009 (RapRVP): Not Detected    Influenza AH1 (RapRVP): Not Detected    Influenza AH3 (RapRVP): Not Detected    Influenza B (RapRVP): Not Detected    Parainfluenza 1 (RapRVP): Not Detected    Parainfluenza 2 (RapRVP): Not Detected    Parainfluenza 3 (RapRVP): Not Detected    Parainfluenza 4 (RapRVP): Not Detected    Resp Syncytial Virus (RapRVP): Not Detected    Chlamydia pneumoniae (RapRVP): Not Detected    Mycoplasma pneumoniae (RapRVP): Not Detected    Entero/Rhinovirus (RapRVP): Not Detected    hMPV (RapRVP): Not Detected    Coronavirus (229E,HKU1,NL63,OC43): Not Detected This Respiratory Panel uses polymerase chain reaction (PCR)  to detect for adenovirus; coronavirus (HKU1, NL63, 229E,  OC43); human metapneumovirus (hMPV); human  enterovirus/rhinovirus (Entero/RV); influenza A; influenza  A/H1: influenza A/H3; influenza A/H1-2009; influenza B;  parainfluenza viruses 1,2,3,4; respiratory syncytial virus;  Mycoplasma pneumoniae; and Chlamydophila pneumoniae.    Malaria Screening (01.29.20 @ 14:31)    Malaria Screening: PARASITES SEEN PLASMODIUM FALCIPARUM  % PARASITEMIA =0.04%  ***** CRITICAL RESULT *****  PERSON CALLED / READ-BACK: JARETH TEMPLE D/YES  DATE / TIME CALLED: 01/29/20 2000  CALLED BY: ASHLEY OLEA  01/29/20 2001:  MALARIA SCREEN previously reported as: PARASITES SEEN    PLASMODIUM FALCIPARUM  % PARASITEMIA =0.04%        RADIOLOGY    < from: US Abdomen Limited (01.29.20 @ 03:05) >  Sludge within the gallbladder without evidence for acute cholecystitis.    < end of copied text >

## 2020-01-30 NOTE — DISCHARGE NOTE PROVIDER - NSDCCPCAREPLAN_GEN_ALL_CORE_FT
PRINCIPAL DISCHARGE DIAGNOSIS  Diagnosis: Malaria  Assessment and Plan of Treatment: You presented to the hospital with symptoms of malaria. We are treating you with anti-malarial pills. You received 2 days of pills, and have one day left. Please  the 4 pills from the pharmacy and take them. Please follow up with Dr. Saleh in the Infectious Disease clinic.

## 2020-01-30 NOTE — DISCHARGE NOTE PROVIDER - HOSPITAL COURSE
33 y/o M w/ no significant PMH who presented to the ED w/ recurrent fevers in the setting of recent travel to [place]. Patient had run out of prophylaxis, then subsequently diagnosed at OSH w/ malaria and started on primaquine and chloroquine. However, these malarial strains are resistant to chloroquine, so patient had smear and PCR confirming malaria. Patient had some LFT elevation as well, but downtrending and attributed to malaria. Treated w/ 2 days of atovaquone-proguanil and will complete third day as outpatient w/ ID clinic follow up. At this time, patient is stable and safe for discharge.

## 2020-01-30 NOTE — PROGRESS NOTE ADULT - SUBJECTIVE AND OBJECTIVE BOX
PROGRESS NOTE:     Patient is a 34y old  Male who presents with a chief complaint of Malaria (29 Jan 2020 10:12)      SUBJECTIVE / OVERNIGHT EVENTS:        MEDICATIONS  (STANDING):  atovaquone 250 mG/proguanil 100 mG 4 Tablet(s) Oral every 24 hours  influenza   Vaccine 0.5 milliLiter(s) IntraMuscular once  sodium chloride 0.9%. 1000 milliLiter(s) (100 mL/Hr) IV Continuous <Continuous>    MEDICATIONS  (PRN):      CAPILLARY BLOOD GLUCOSE        I&O's Summary      PHYSICAL EXAM:  Vital Signs Last 24 Hrs  T(C): 37.4 (30 Jan 2020 05:31), Max: 37.4 (29 Jan 2020 12:52)  T(F): 99.4 (30 Jan 2020 05:31), Max: 99.4 (29 Jan 2020 12:52)  HR: 82 (30 Jan 2020 05:31) (82 - 86)  BP: 101/65 (30 Jan 2020 05:31) (101/65 - 117/65)  BP(mean): --  RR: 17 (30 Jan 2020 05:31) (17 - 18)  SpO2: 98% (30 Jan 2020 05:31) (98% - 98%)    CONSTITUTIONAL: NAD, well-developed  RESPIRATORY: Normal respiratory effort; lungs are clear to auscultation bilaterally  CARDIOVASCULAR: Regular rate and rhythm, normal S1 and S2, no murmur/rub/gallop; No lower extremity edema; Peripheral pulses are 2+ bilaterally  ABDOMEN: Nontender to palpation, normoactive bowel sounds, no rebound/guarding; No hepatosplenomegaly  MUSCLOSKELETAL: no clubbing or cyanosis of digits; no joint swelling or tenderness to palpation  NEURO: CN 2-12 grossly intact, moves all limbs spontaneously  PSYCH: A+O to person, place, and time; affect appropriate    LABS:                        10.7   6.04  )-----------( 130      ( 30 Jan 2020 05:14 )             29.1     01-30    131<L>  |  95<L>  |  14  ----------------------------<  107<H>  3.6   |  23  |  1.12    Ca    8.4      30 Jan 2020 05:14  Phos  4.2     01-30  Mg     2.3     01-30    TPro  6.6  /  Alb  2.8<L>  /  TBili  1.1  /  DBili  x   /  AST  100<H>  /  ALT  160<H>  /  AlkPhos  126<H>  01-30      CARDIAC MARKERS ( 30 Jan 2020 05:14 )  x     / x     / 58 u/L / x     / x              Culture - Blood (collected 28 Jan 2020 22:49)  Source: BLOOD VENOUS  Preliminary Report (29 Jan 2020 22:49):    NO ORGANISMS ISOLATED    NO ORGANISMS ISOLATED AT 24 HOURS    Culture - Blood (collected 28 Jan 2020 22:25)  Source: BLOOD PERIPHERAL  Preliminary Report (29 Jan 2020 22:24):    NO ORGANISMS ISOLATED    NO ORGANISMS ISOLATED AT 24 HOURS        RADIOLOGY & ADDITIONAL TESTS:  Results Reviewed:   Imaging Personally Reviewed:  Electrocardiogram Personally Reviewed:    COORDINATION OF CARE:  Care Discussed with Consultants/Other Providers [Y/N]:  Prior or Outpatient Records Reviewed [Y/N]: PROGRESS NOTE:     Patient is a 34y old  Male who presents with a chief complaint of Malaria (29 Jan 2020 10:12)      SUBJECTIVE / OVERNIGHT EVENTS: Patient seen and examined at bedside w/ no subjective complaints. NAEO.        MEDICATIONS  (STANDING):  atovaquone 250 mG/proguanil 100 mG 4 Tablet(s) Oral every 24 hours  influenza   Vaccine 0.5 milliLiter(s) IntraMuscular once  sodium chloride 0.9%. 1000 milliLiter(s) (100 mL/Hr) IV Continuous <Continuous>    MEDICATIONS  (PRN):      CAPILLARY BLOOD GLUCOSE        I&O's Summary      PHYSICAL EXAM:  Vital Signs Last 24 Hrs  T(C): 37.4 (30 Jan 2020 05:31), Max: 37.4 (29 Jan 2020 12:52)  T(F): 99.4 (30 Jan 2020 05:31), Max: 99.4 (29 Jan 2020 12:52)  HR: 82 (30 Jan 2020 05:31) (82 - 86)  BP: 101/65 (30 Jan 2020 05:31) (101/65 - 117/65)  BP(mean): --  RR: 17 (30 Jan 2020 05:31) (17 - 18)  SpO2: 98% (30 Jan 2020 05:31) (98% - 98%)    General appearance: NAD, conversant, afebrile    Eyes: anicteric sclerae, moist conjunctivae; no lid-lag   HENT: Atraumatic; oropharynx clear with moist mucous membranes    Neck: Trachea midline; Full range of motion, supple   Pulm: Lungs clear to auscultation bilaterally, with normal respiratory effort and no intercostal retractions; normal work of breathing   CV: Regular Rhythm and Rate; Normal S1, S2; No murmurs, rubs, or gallops   Abdomen: Soft, non-tender, non-distended   Extremities: No peripheral edema    Psych: Appropriate affect, cooperative; alert and oriented to person, place and time    LABS:                        10.7   6.04  )-----------( 130      ( 30 Jan 2020 05:14 )             29.1     01-30    131<L>  |  95<L>  |  14  ----------------------------<  107<H>  3.6   |  23  |  1.12    Ca    8.4      30 Jan 2020 05:14  Phos  4.2     01-30  Mg     2.3     01-30    TPro  6.6  /  Alb  2.8<L>  /  TBili  1.1  /  DBili  x   /  AST  100<H>  /  ALT  160<H>  /  AlkPhos  126<H>  01-30      CARDIAC MARKERS ( 30 Jan 2020 05:14 )  x     / x     / 58 u/L / x     / x              Culture - Blood (collected 28 Jan 2020 22:49)  Source: BLOOD VENOUS  Preliminary Report (29 Jan 2020 22:49):    NO ORGANISMS ISOLATED    NO ORGANISMS ISOLATED AT 24 HOURS    Culture - Blood (collected 28 Jan 2020 22:25)  Source: BLOOD PERIPHERAL  Preliminary Report (29 Jan 2020 22:24):    NO ORGANISMS ISOLATED    NO ORGANISMS ISOLATED AT 24 HOURS        RADIOLOGY & ADDITIONAL TESTS:  Results Reviewed:   Imaging Personally Reviewed:  Electrocardiogram Personally Reviewed:    COORDINATION OF CARE:  Care Discussed with Consultants/Other Providers [Y/N]:  Prior or Outpatient Records Reviewed [Y/N]:

## 2020-01-30 NOTE — PROGRESS NOTE ADULT - PROBLEM SELECTOR PLAN 2
Hyponatremic to 127; asymptomatic  -f/u urine lytes, uric acid  -will monitor lytes Hyponatremic to 127; asymptomatic  -sodium improving s/p maintenance fluids to 131  -uric acid normal  -will monitor lytes

## 2020-01-30 NOTE — DISCHARGE NOTE NURSING/CASE MANAGEMENT/SOCIAL WORK - PATIENT PORTAL LINK FT
You can access the FollowMyHealth Patient Portal offered by [facility] by registering at the following website: [email or web address]. By joining [employer]’s FollowMyHealth portal, you will also be able to view your health information using other applications (apps) compatible with our system.

## 2020-01-30 NOTE — PROGRESS NOTE ADULT - PROBLEM SELECTOR PLAN 1
-patient with fevers at home, tachycardia >90 and a source of infection (Malaria parasite)  -sepsis 2/2 to most likely Malaria  Recently traveled to [place] tested positive for malaria s/p 6 days of tx w/ primaquine/chloroquine; Bili-1.8, AST-226, ALT-225, Lactate-2,4. Will r/o other infectious etiologies  -f/u chickungunya ab, malaria pcr, dengue fever ab, blood/urine cx.   -ID c/s for further management of malaria  -tylenol prn for fever -patient with fevers at home, tachycardia >90 and a source of infection (Malaria parasite)  -sepsis 2/2 to most likely Malaria  Recently traveled to [place] tested positive for malaria s/p 6 days of tx w/ primaquine/chloroquine; Bili-1.8, AST-226, ALT-225, Lactate-2,4. Will r/o other infectious etiologies  -malaria screen positve  -atovaquone/proguanil 4 tabs PO once daily 2/3  -f/u chickungunya ab, malaria pcr, dengue fever ab, blood/urine cx

## 2020-01-30 NOTE — PROGRESS NOTE ADULT - PROBLEM SELECTOR PLAN 6
1.  Name of PCP: none but will give patient resources prior to discharge for new PCP  2.  PCP Contacted on Admission: [ ] Y    [ ] N    3.  PCP contacted at Discharge: [ ] Y    [ ] N    [ ] N/A  4.  Post-Discharge Appointment Date and Location:  5.  Summary of Handoff given to PCP:

## 2020-01-30 NOTE — PROGRESS NOTE ADULT - PROBLEM SELECTOR PLAN 3
Scr 1.26, no baseline obtained likely pre-renal in setting of acute illness  -will cont to monitor Scr 1.26, no baseline obtained likely pre-renal in setting of acute illness  -Scr 1.12, improving

## 2020-02-01 LAB
DENV IGG+IGM PNL SER IA: 9.61 — HIGH
DENV IGM SER-ACNC: < 1 — SIGNIFICANT CHANGE UP

## 2020-02-02 LAB
BACTERIA BLD CULT: SIGNIFICANT CHANGE UP
BACTERIA BLD CULT: SIGNIFICANT CHANGE UP

## 2020-02-04 LAB
CHIKUNGUNYA AB IGG IGM W/REFLEX RESULT: SIGNIFICANT CHANGE UP
CHIKUNGUNYA AB IGG REFLEX RESULT: SIGNIFICANT CHANGE UP
CHIKV AB TITR SER: SIGNIFICANT CHANGE UP
CHIKV IGG TITR SERPL IF: SIGNIFICANT CHANGE UP

## 2020-02-19 LAB — P MALARIAE AB SER-ACNC: SIGNIFICANT CHANGE UP

## 2021-02-26 NOTE — ED ADULT NURSE NOTE - SUICIDE SCREENING DEPRESSION
02/25/21 1905   Events/Summary/Plan   Events/Summary/Plan RT Assessment   Vital Signs   Pulse 72   Respiration 16   Pulse Oximetry 94 %   $ Pulse Oximetry (Spot Check) Yes   Respiratory Assessment   Level of Consciousness Alert   Oxygen   O2 (LPM) 2   O2 Delivery Device Nasal Cannula   Smoking History   Have you ever smoked Yes   Have you smoked in the last 12 months No   Confirm Quit Date 02/25/20      Negative

## 2021-12-24 ENCOUNTER — EMERGENCY (EMERGENCY)
Facility: HOSPITAL | Age: 36
LOS: 0 days | Discharge: HOME | End: 2021-12-25
Attending: EMERGENCY MEDICINE
Payer: COMMERCIAL

## 2021-12-24 VITALS
TEMPERATURE: 99 F | RESPIRATION RATE: 17 BRPM | DIASTOLIC BLOOD PRESSURE: 70 MMHG | HEIGHT: 71 IN | SYSTOLIC BLOOD PRESSURE: 117 MMHG | OXYGEN SATURATION: 97 % | WEIGHT: 197.98 LBS | HEART RATE: 79 BPM

## 2021-12-24 DIAGNOSIS — Z20.822 CONTACT WITH AND (SUSPECTED) EXPOSURE TO COVID-19: ICD-10-CM

## 2021-12-24 PROCEDURE — 99282 EMERGENCY DEPT VISIT SF MDM: CPT

## 2021-12-25 PROBLEM — Z78.9 OTHER SPECIFIED HEALTH STATUS: Chronic | Status: ACTIVE | Noted: 2020-01-29

## 2021-12-25 LAB
FLUAV AG NPH QL: SIGNIFICANT CHANGE UP
FLUBV AG NPH QL: SIGNIFICANT CHANGE UP
SARS-COV-2 RNA SPEC QL NAA+PROBE: SIGNIFICANT CHANGE UP

## 2021-12-25 NOTE — ED ADULT NURSE NOTE - ISOLATION INDICATION AIRBORNE
lisinopril (ZESTRIL) 10 MG tablet 90 tablet 0 6/3/2019     Sig - Route: Take 1 tablet by mouth daily. - Oral    Sent to pharmacy as: Lisinopril 10 MG Oral Tablet    Class: Eprescribe    Notes to Pharmacy: **Patient requests 90 days supply** Please disregard previous script sent for 90 with 11 refills.      hydrochlorothiazide (HYDRODIURIL) 25 MG tablet 90 tablet 2 12/26/2018     Sig - Route: TAKE 1 TABLET BY MOUTH DAILY - Oral    Sent to pharmacy as: HydroCHLOROthiazide 25 MG Oral Tablet    Class: Eprescribe    E-Prescribing Status: Receipt confirmed by pharmacy (12/26/2018 10:25 AM CST)      Rx filled per provider protocol    
Other Specify

## 2021-12-25 NOTE — ED ADULT NURSE NOTE - NSIMPLEMENTINTERV_GEN_ALL_ED
Implemented All Universal Safety Interventions:  [place] to call system. Call bell, personal items and telephone within reach. Instruct patient to call for assistance. Room bathroom lighting operational. Non-slip footwear when patient is off stretcher. Physically safe environment: no spills, clutter or unnecessary equipment. Stretcher in lowest position, wheels locked, appropriate side rails in place.

## 2021-12-25 NOTE — ED PROVIDER NOTE - PATIENT PORTAL LINK FT
You can access the FollowMyHealth Patient Portal offered by [facility] by registering at the following website: [email or web address]. By joining [employer]’s FollowMyHealth portal, you will also be able to view your health information using other applications (apps) compatible with our system.

## 2021-12-25 NOTE — ED ADULT NURSE NOTE - OBJECTIVE STATEMENT
pt presents to the ED requesting covid test, denies any symptoms. exposure to cov positive 2 days ago

## 2021-12-25 NOTE — ED ADULT NURSE NOTE - PAIN RATING/NUMBER SCALE (0-10): ACTIVITY
Patient will  letter at the [place] location before lunch today. Please have letter ready.     Thanks 0

## 2024-11-14 NOTE — CONSULT NOTE ADULT - REASON FOR ADMISSION
Malaria Render Risk Assessment In Note?: no Detail Level: Simple Additional Notes: Looks like a splinter was present. Upon removal, pressure applied and pt did not feel anything. Advised to rto if returns

## 2025-01-07 NOTE — ED ADULT NURSE NOTE - NSFALLRSKINDICATORS_ED_ALL_ED
Informed patient of note below  and last  name verified ,verbalized understanding ,Rn generated order for BMP.   no

## 2025-05-02 NOTE — H&P ADULT - NSHPRISKHIVSCREEN_GEN_ALL_CORE
Patient presents for Zarxio injection today.     I am an RN. Does the patient have an active anti-cancer treatment plan? (oral, injection, or IV) Yes.   Regimen: R-CHOP every 21 days x 6 cycles   Cycle/Day: C4 delayed 1 week d/t low ANC; Zarxio 480 mcg    ECOG:   ECOG [05/02/25 1012]   ECOG Performance Status 0       Oral Chemotherapy: No  Nursing Assessment:  A comprehensive nursing assessment was performed and the patient reports the following:    Anxiety/Depression/Insomnia: Anxiety: No, Depression: No, and Insomnia: No  Pain: NO    Toxicity Assessment    Auditory/Ear  Assessment: Yes (Within Defined Limits)    Cardiac General  Assessment: Yes (w/ Exceptions to WDL)  Hypertension: Grade 1    Constitutional  Assessment: Yes (w/ Exceptions to WDL)  Fatigue: Grade 1    Dermatology/Skin  Assessment: Yes (w/ Exceptions to WDL)  Alopecia: Grade 2  Bruising: Grade 1    Endocrine  Assessment: Yes (Within Defined Limits)    Gastrointestinal  Assessment: Yes (w/ Exceptions to WDL)  Anorexia: Grade 1    Hemorrhage/Bleeding  Assessment: Yes (Within Defined Limits)    Infection  Assessment: Yes (Within Defined Limits)    Lymphatics  Assessment: Yes (Within Defined Limits)    Musculoskeletal  Assessment: Yes (Within Defined Limits)    Neurology  Assessment: Yes (w/ Exceptions to WDL)  Paresthesia: Grade 1 (starting gabapentin)    Ocular  Assessment: Yes (Within Defined Limits)    Pain  Assessment: Yes (Within Defined Limits)    Pulmonary/Upper Respiratory  Assessment: Yes (Within Defined Limits)    Genitourinary  Assessment: Yes (Within Defined Limits)        Patient has valid pre-authorization, VS completed, and Lab results checked - MD notified    Reviewed and verified Advanced Directives: No: Patient declined to create/provide document at this time     Pre-Injection Information: Allergies reviewed as required for injection type., Pt states feeling well, no complaints., and Pt denies signs and symptoms of infection.    Refer  to MAR (medication administration record) for type of injection and medication given.  Needle Size: per package  Patient tolerated well: Stable and Follow up appointment scheduled    Future Appointments   Date Time Provider Department Center   5/5/2025  1:15 PM [facility] [address]   5/5/2025  1:30 PM [facility] TREATMENT CHAIR [address]         Dr. Hernandez is supervising clinician today.   Unable to offer due to clinical condition